# Patient Record
Sex: MALE | Race: BLACK OR AFRICAN AMERICAN | Employment: PART TIME | URBAN - METROPOLITAN AREA
[De-identification: names, ages, dates, MRNs, and addresses within clinical notes are randomized per-mention and may not be internally consistent; named-entity substitution may affect disease eponyms.]

---

## 2017-06-29 ENCOUNTER — APPOINTMENT (OUTPATIENT)
Dept: GENERAL RADIOLOGY | Age: 47
End: 2017-06-29
Attending: NURSE PRACTITIONER
Payer: MEDICARE

## 2017-06-29 ENCOUNTER — HOSPITAL ENCOUNTER (EMERGENCY)
Age: 47
Discharge: HOME | End: 2017-06-29
Attending: STUDENT IN AN ORGANIZED HEALTH CARE EDUCATION/TRAINING PROGRAM
Payer: MEDICARE

## 2017-06-29 VITALS
SYSTOLIC BLOOD PRESSURE: 145 MMHG | HEART RATE: 87 BPM | WEIGHT: 191.8 LBS | TEMPERATURE: 98.7 F | OXYGEN SATURATION: 97 % | BODY MASS INDEX: 27.46 KG/M2 | DIASTOLIC BLOOD PRESSURE: 80 MMHG | RESPIRATION RATE: 16 BRPM | HEIGHT: 70 IN

## 2017-06-29 DIAGNOSIS — J06.9 ACUTE UPPER RESPIRATORY INFECTION: Primary | ICD-10-CM

## 2017-06-29 DIAGNOSIS — H10.9 CONJUNCTIVITIS OF LEFT EYE, UNSPECIFIED CONJUNCTIVITIS TYPE: ICD-10-CM

## 2017-06-29 LAB — S PYO AG THROAT QL: NEGATIVE

## 2017-06-29 PROCEDURE — 87880 STREP A ASSAY W/OPTIC: CPT

## 2017-06-29 PROCEDURE — 99283 EMERGENCY DEPT VISIT LOW MDM: CPT

## 2017-06-29 PROCEDURE — 87070 CULTURE OTHR SPECIMN AEROBIC: CPT | Performed by: NURSE PRACTITIONER

## 2017-06-29 PROCEDURE — 71020 XR CHEST PA LAT: CPT

## 2017-06-29 RX ORDER — POLYMYXIN B SULFATE AND TRIMETHOPRIM 1; 10000 MG/ML; [USP'U]/ML
1 SOLUTION OPHTHALMIC EVERY 6 HOURS
Qty: 10 ML | Refills: 0 | Status: SHIPPED | OUTPATIENT
Start: 2017-06-29 | End: 2017-07-06

## 2017-06-29 NOTE — ED TRIAGE NOTES
TRIAGE NOTE:  Patient arrives with c/o left eye redness and discharge that began Monday. Patient also reports sore throat and runny nose.

## 2017-06-29 NOTE — ED PROVIDER NOTES
HPI Comments: 43 yo M with hx of HIV presents ambulatory to the ED for evaluation of left eye redness, ST, nonproductive cough since Monday. Pt denies SOB, fever or chills. States he is HIV positive but is on medication and states \"my numbers are good. \"  Has been taking medication as prescribed but admits to missing dose this past week. Past Medical History:  No date: HIV disease ([facility]    Social History    Marital status: SINGLE              Spouse name:                       Years of education:                 Number of children:               Occupational History    None on file    Social History Main Topics    Smoking status: Never Smoker                                                                   Smokeless status: Not on file                       Alcohol use: Yes                Comment: social    Drug use: Not on file     Sexual activity: Not on file          Other Topics            Concern    None on file    Social History Narrative    None on file          Patient is a 42 y.o. male presenting with eye problem and sore throat. The history is provided by the patient. No  was used. Eye Problem    Pertinent negatives include no discharge, no nausea, no vomiting and no fever. Sore Throat    Associated symptoms include cough. Pertinent negatives include no vomiting, no congestion, no headaches, no shortness of breath and no trouble swallowing. Past Medical History:   Diagnosis Date    HIV disease ([facility]        History reviewed. No pertinent surgical history. History reviewed. No pertinent family history. Social History     Social History    Marital status: SINGLE     Spouse name: N/A    Number of children: N/A    Years of education: N/A     Occupational History    Not on file.      Social History Main Topics    Smoking status: Never Smoker    Smokeless tobacco: Not on file    Alcohol use Yes      Comment: social    Drug use: Not on file    Sexual activity: Not on file     Other Topics Concern    Not on file     Social History Narrative         ALLERGIES: Review of patient's allergies indicates no known allergies. Review of Systems   Constitutional: Negative for chills and fever. HENT: Positive for postnasal drip, rhinorrhea and sore throat. Negative for congestion, facial swelling and trouble swallowing. Eyes: Negative for discharge. Respiratory: Positive for cough. Negative for chest tightness, shortness of breath and wheezing. Cardiovascular: Negative for chest pain and leg swelling. Gastrointestinal: Negative for abdominal pain, nausea and vomiting. Genitourinary: Negative for dysuria, flank pain and urgency. Musculoskeletal: Negative for back pain. Skin: Negative for color change. Neurological: Negative for seizures, facial asymmetry and headaches. Psychiatric/Behavioral: Negative for confusion. Vitals:    06/29/17 1700   BP: 145/86   Pulse: 99   Resp: 18   Temp: 98.7 °F (37.1 °C)   SpO2: 97%   Weight: 87 kg (191 lb 12.8 oz)   Height: 5' 10\" (1.778 m)            Physical Exam   Constitutional: He is oriented to person, place, and time. He appears well-developed and well-nourished. No distress. HENT:   Head: Normocephalic and atraumatic. Nose: Mucosal edema present. Mouth/Throat: Uvula is midline and mucous membranes are normal. Posterior oropharyngeal erythema present. No oropharyngeal exudate or tonsillar abscesses. Fluid noted behind R TM, + oropharyngeal erythema with 2-3+ tonsillar inflammation, no exudates noted. + mucosal inflammation. No frontal or maxillary sinus tenderness. Eyes: Conjunctivae and EOM are normal. Pupils are equal, round, and reactive to light. Neck: Normal range of motion and full passive range of motion without pain. Neck supple. No spinous process tenderness and no muscular tenderness present. No rigidity. Normal range of motion present.    Cardiovascular: Normal rate, regular rhythm, normal heart sounds and intact distal pulses. Pulmonary/Chest: Effort normal and breath sounds normal.   B breath sounds CTA. No expiratory wheezing, crackles or rhonchi. No chest wall tenderness, tachypnea or tachycardia. No evidence of hypoxia. Abdominal: Soft. Bowel sounds are normal. He exhibits no distension and no mass. There is no tenderness. There is no rigidity, no rebound, no guarding and no CVA tenderness. Abdomen soft, nontender with active BS throughout. No rigidity, masses or guarding. No flank or CVA tenderness. Musculoskeletal: Normal range of motion. Neurological: He is alert and oriented to person, place, and time. He has normal strength. He displays no atrophy. No cranial nerve deficit or sensory deficit. He exhibits normal muscle tone. Coordination and gait normal. GCS eye subscore is 4. GCS verbal subscore is 5. GCS motor subscore is 6. Skin: Skin is warm and dry. Psychiatric: He has a normal mood and affect. His behavior is normal. Judgment and thought content normal.   Nursing note and vitals reviewed. MDM  Number of Diagnoses or Management Options  Acute upper respiratory infection:   Conjunctivitis of left eye, unspecified conjunctivitis type:   Diagnosis management comments: 43 yo M, HIV positive male presents ambulatory to the ED for evaluation of nonproductive cough, left eye redness, ST, runny nose since Monday. Denies fever, chills, nausea, vomiting, SOB, wheezing, abdominal or chest pain. States he missed a dose of his HIV meds last week. B breath sounds CTA. Abdomen soft, nontender. POC strep test, CXR ordered. Results reviewed. Discussed hx, presentation and findings with Dr. Cher Ott who agrees with plan of care and plan for discharge with return to the ED for worsening sx. Will discharge pt with medication for conjunctivitis, recommend symptomatic tx for URI.          Amount and/or Complexity of Data Reviewed  Clinical lab tests: ordered and reviewed  Tests in the radiology section of CPT®: ordered and reviewed  Discuss the patient with other providers: yes (Dr. Alonso Goltz )    Patient Progress  Patient progress: stable    ED Course       Procedures

## 2017-07-01 LAB
BACTERIA SPEC CULT: NORMAL
SERVICE CMNT-IMP: NORMAL
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
